# Patient Record
Sex: MALE | Race: WHITE | NOT HISPANIC OR LATINO | Employment: FULL TIME | URBAN - METROPOLITAN AREA
[De-identification: names, ages, dates, MRNs, and addresses within clinical notes are randomized per-mention and may not be internally consistent; named-entity substitution may affect disease eponyms.]

---

## 2019-02-27 ENCOUNTER — OFFICE VISIT (OUTPATIENT)
Dept: FAMILY MEDICINE | Facility: CLINIC | Age: 39
End: 2019-02-27
Payer: COMMERCIAL

## 2019-02-27 VITALS
HEART RATE: 77 BPM | TEMPERATURE: 98.6 F | DIASTOLIC BLOOD PRESSURE: 90 MMHG | WEIGHT: 205 LBS | HEIGHT: 72 IN | SYSTOLIC BLOOD PRESSURE: 148 MMHG | BODY MASS INDEX: 27.77 KG/M2 | RESPIRATION RATE: 17 BRPM | OXYGEN SATURATION: 96 %

## 2019-02-27 DIAGNOSIS — R03.0 ELEVATED BP WITHOUT DIAGNOSIS OF HYPERTENSION: Primary | ICD-10-CM

## 2019-02-27 PROCEDURE — 99203 OFFICE O/P NEW LOW 30 MIN: CPT | Performed by: PHYSICIAN ASSISTANT

## 2019-02-27 ASSESSMENT — MIFFLIN-ST. JEOR: SCORE: 1882.87

## 2019-02-27 NOTE — PATIENT INSTRUCTIONS
Patient Education     Eating Heart-Healthy Food: Using the DASH Plan    Eating for your heart doesn t have to be hard or boring. You just need to know how to make healthier choices. The DASH eating plan has been developed to help you do just that. DASH stands for Dietary Approaches to Stop Hypertension. It is a plan that has been proven to be healthier for your heart and to lower your risk for high blood pressure. It can also help lower your risk for cancer, heart disease, osteoporosis, and diabetes.  Choosing from each food group  Choose foods from each of the food groups below each day. Try to get the recommended number of servings for each food group. The serving numbers are based on a diet of 2,000 calories a day. Talk to your doctor if you re unsure about your calorie needs. Along with getting the correct servings, the DASH plan also recommends a sodium intake less than 2,300 mg per day.        Grains  Servings: 6 to 8 a day  A serving is:    1 slice bread    1 ounce dry cereal    Half a cup cooked rice, pasta or cereal  Best choices: Whole grains and any grains high in fiber. Vegetables  Servings: 4 to 5 a day  A serving is:    1 cup raw leafy vegetable    Half a cup cut-up raw or cooked vegetable    Half a cup vegetable juice  Best choices: Fresh or frozen vegetables prepared without added salt or fat.   Fruits  Servings: 4 to 5 a day  A serving is:    1 medium fruit    One-quarter cup dried fruit    Half a cup fresh, frozen, or canned fruit    Half a cup of 100% fruit juices  Best choices: A variety of fresh fruits of different colors. Whole fruits are a better choice than fruit juices. Low-fat or fat-free dairy  Servings: 2 to 3 a day  A serving is:    1 cup milk    1 cup yogurt    One and a half ounces cheese  Best choices: Skim or 1% milk, low-fat or fat-free yogurt or buttermilk, and low-fat cheeses.         Lean meats, poultry, fish  Servings: 6 or fewer a day  A serving is:    1 ounce cooked meats,  poultry, or fish    1 egg  Best choices: Lean poultry and fish. Trim away visible fat. Broil, grill, roast, or boil instead of frying. Remove skin from poultry before eating. Limit how much red meat you eat.  Nuts, seeds, beans  Servings: 4 to 5 a week  A serving is:    One-third cup nuts (one and a half ounces)    2 tablespoons nut butter or seeds    Half a cup cooked dry beans or legumes  Best choices: Dry roasted nuts with no salt added, lentils, kidney beans, garbanzo beans, and whole pablo beans.   Fats and oils  Servings: 2 to 3 a day  A serving is:    1 teaspoon vegetable oil    1 teaspoon soft margarine    1 tablespoon mayonnaise    2 tablespoons salad dressing  Best choices: Nut and vegetable oils (nontropical vegetable oils), such as olive and canola oil. Sweets  Servings: 5 a week or fewer  A serving is:    1 tablespoon sugar, maple syrup, or honey    1 tablespoon jam or jelly    1 half-ounce jelly beans (about 15)    1 cup lemonade  Best choices: Dried fruit can be a satisfying sweet. Choose low-fat sweets. And watch your serving sizes!      For more on the DASH eating plan, visit:  www.nhlbi.nih.gov/health/health-topics/topics/dash   Date Last Reviewed: 6/1/2016 2000-2018 [employer]. [address] [place] [address], [place] [address]. All rights reserved. This information is not intended as a substitute for professional medical care. Always follow your healthcare professional's instructions.

## 2019-02-27 NOTE — PROGRESS NOTES
SUBJECTIVE:   Jose Canales is a 39 year old male who presents to clinic today for the following health issues:      New Patient/Transfer of Care- patient is here to talk about his BP. He had a DOT PX and they stated he has High blood pressure was told he needed to be seen for it.  His BP on 2/25/19 was 150s/90s. No chest pain or shortness of breath. Family history of htn in father. No headaches or vision changes. No history of htn. No medications or supplements taken.     Problem list and histories reviewed & adjusted, as indicated.  Additional history: as documented    There is no problem list on file for this patient.    Past Surgical History:   Procedure Laterality Date     APPENDECTOMY  2005       Social History     Tobacco Use     Smoking status: Current Some Day Smoker     Smokeless tobacco: Never Used   Substance Use Topics     Alcohol use: Yes     Comment: socially     Family History   Problem Relation Age of Onset     Hypertension Father      Coronary Artery Disease Paternal Grandfather          No current outpatient medications on file.     No Known Allergies  BP Readings from Last 3 Encounters:   02/27/19 148/90   04/21/15 (!) 141/103   04/21/15 120/90    Wt Readings from Last 3 Encounters:   02/27/19 93 kg (205 lb)   04/21/15 88.9 kg (196 lb)   04/21/15 88.9 kg (196 lb)                    Reviewed and updated as needed this visit by clinical staff  Tobacco  Allergies  Meds  Problems  Med Hx  Surg Hx  Fam Hx  Soc Hx        Reviewed and updated as needed this visit by Provider  Tobacco  Allergies  Meds  Problems  Med Hx  Surg Hx  Fam Hx         ROS:  Constitutional, HEENT, cardiovascular, pulmonary, gi and gu systems are negative, except as otherwise noted.    OBJECTIVE:     /90 (BP Location: Right arm, Patient Position: Chair, Cuff Size: Adult Regular)   Pulse 77   Temp 98.6  F (37  C) (Oral)   Resp 17   Ht 1.829 m (6')   Wt 93 kg (205 lb)   SpO2 96%   BMI 27.80 kg/m     Body mass index is 27.8 kg/m .  GENERAL: healthy, alert and no distress  RESP: lungs clear to auscultation - no rales, rhonchi or wheezes  CV: regular rates and rhythm, normal S1 S2, no S3 or S4 and no murmur, click or rub  PSYCH: mentation appears normal, affect normal/bright    Diagnostic Test Results:  none     ASSESSMENT/PLAN:     (R03.0) Elevated BP without diagnosis of hypertension  (primary encounter diagnosis)  Comment: noted on exam. Needs 3 normal BPs prior to may of 2019 to receive long term dot from. Recommending returning to clinic in 1-2 weeks for BP check. If elevated, discussed starting lisinopril today. Patient is in agreement. Diet and life style changed discussed. See patient instructions. If lisinopril is needed, will need baseline bmp and follow up bmp 2 weeks post starting.   Plan:     Follow up: as above     John Kelley PA-C  [facility]

## 2019-03-11 ENCOUNTER — ALLIED HEALTH/NURSE VISIT (OUTPATIENT)
Dept: NURSING | Facility: CLINIC | Age: 39
End: 2019-03-11
Payer: COMMERCIAL

## 2019-03-11 VITALS — HEART RATE: 79 BPM | DIASTOLIC BLOOD PRESSURE: 80 MMHG | SYSTOLIC BLOOD PRESSURE: 130 MMHG

## 2019-03-11 DIAGNOSIS — Z01.30 BP CHECK: Primary | ICD-10-CM

## 2019-03-11 PROCEDURE — 99207 ZZC NO CHARGE NURSE ONLY: CPT

## 2019-03-11 NOTE — PROGRESS NOTES
Jose Canales is a 39 year old patient who comes in today for a Blood Pressure check.  Initial BP:  There were no vitals taken for this visit.     Data Unavailable  Disposition: follow-up as previously indicated by provider    Shari Schoenberger, CMA

## 2019-03-12 ENCOUNTER — ALLIED HEALTH/NURSE VISIT (OUTPATIENT)
Dept: NURSING | Facility: CLINIC | Age: 39
End: 2019-03-12
Payer: COMMERCIAL

## 2019-03-12 VITALS — SYSTOLIC BLOOD PRESSURE: 136 MMHG | DIASTOLIC BLOOD PRESSURE: 88 MMHG | HEART RATE: 80 BPM

## 2019-03-12 DIAGNOSIS — Z01.30 BP CHECK: Primary | ICD-10-CM

## 2019-03-12 PROCEDURE — 99207 ZZC NO CHARGE NURSE ONLY: CPT

## 2019-03-12 NOTE — PROGRESS NOTES
Jose Canales is a 39 year old patient who comes in today for a Blood Pressure check.  Initial BP:  /88 (BP Location: Right arm, Patient Position: Chair, Cuff Size: Adult Regular)   Pulse 80      80  Disposition: follow-up as previously indicated by provider    Shari Schoenberger, RANJIT

## 2019-03-30 ENCOUNTER — OFFICE VISIT (OUTPATIENT)
Dept: FAMILY MEDICINE | Facility: CLINIC | Age: 39
End: 2019-03-30
Payer: COMMERCIAL

## 2019-03-30 VITALS
SYSTOLIC BLOOD PRESSURE: 135 MMHG | WEIGHT: 195.8 LBS | BODY MASS INDEX: 26.52 KG/M2 | HEART RATE: 99 BPM | RESPIRATION RATE: 14 BRPM | OXYGEN SATURATION: 98 % | HEIGHT: 72 IN | TEMPERATURE: 98.2 F | DIASTOLIC BLOOD PRESSURE: 83 MMHG

## 2019-03-30 DIAGNOSIS — R03.0 ELEVATED BP WITHOUT DIAGNOSIS OF HYPERTENSION: ICD-10-CM

## 2019-03-30 DIAGNOSIS — Z00.00 VISIT FOR PREVENTIVE HEALTH EXAMINATION: Primary | ICD-10-CM

## 2019-03-30 DIAGNOSIS — J30.1 SEASONAL ALLERGIC RHINITIS DUE TO POLLEN: ICD-10-CM

## 2019-03-30 LAB
CHOLEST SERPL-MCNC: 175 MG/DL
GLUCOSE SERPL-MCNC: 85 MG/DL (ref 70–99)
HDLC SERPL-MCNC: 29 MG/DL
LDLC SERPL CALC-MCNC: 118 MG/DL
NONHDLC SERPL-MCNC: 146 MG/DL
TRIGL SERPL-MCNC: 140 MG/DL

## 2019-03-30 PROCEDURE — 99395 PREV VISIT EST AGE 18-39: CPT | Performed by: PHYSICIAN ASSISTANT

## 2019-03-30 PROCEDURE — 36415 COLL VENOUS BLD VENIPUNCTURE: CPT | Performed by: PHYSICIAN ASSISTANT

## 2019-03-30 PROCEDURE — 82947 ASSAY GLUCOSE BLOOD QUANT: CPT | Performed by: PHYSICIAN ASSISTANT

## 2019-03-30 PROCEDURE — 80061 LIPID PANEL: CPT | Performed by: PHYSICIAN ASSISTANT

## 2019-03-30 RX ORDER — MONTELUKAST SODIUM 10 MG/1
10 TABLET ORAL AT BEDTIME
Qty: 90 TABLET | Refills: 1 | Status: SHIPPED | OUTPATIENT
Start: 2019-03-30

## 2019-03-30 ASSESSMENT — ENCOUNTER SYMPTOMS
CONSTIPATION: 0
SORE THROAT: 0
FREQUENCY: 0
FEVER: 0
NAUSEA: 0
MYALGIAS: 0
EYE PAIN: 0
WEAKNESS: 0
PARESTHESIAS: 0
PALPITATIONS: 0
COUGH: 0
DIARRHEA: 0
HEMATOCHEZIA: 0
DIZZINESS: 0
CHILLS: 0
JOINT SWELLING: 0
SHORTNESS OF BREATH: 0
ARTHRALGIAS: 0
HEMATURIA: 0
HEADACHES: 0
NERVOUS/ANXIOUS: 0
ABDOMINAL PAIN: 0
HEARTBURN: 0
DYSURIA: 0

## 2019-03-30 ASSESSMENT — MIFFLIN-ST. JEOR: SCORE: 1841.14

## 2019-03-30 NOTE — PROGRESS NOTES
SUBJECTIVE:   CC: Jose Canales is an 39 year old male who presents for preventative health visit.     Physical   Annual:     Getting at least 3 servings of Calcium per day:  Yes    Bi-annual eye exam:  Yes    Dental care twice a year:  Yes    Sleep apnea or symptoms of sleep apnea:  None    Diet:  Carbohydrate counting    Frequency of exercise:  2-3 days/week    Duration of exercise:  Less than 15 minutes    Taking medications regularly:  Yes    Medication side effects:  Not applicable    Additional concerns today:  No    PHQ-2 Total Score: 0      History of seasonal allergies. Uses otc flonase, zaditor, and claritin. Minimal relief.     Has worked on diet and exercise changes since last visit. Has lost 10 lbs and blood pressure has been in the acceptable range.     Today's PHQ-2 Score:   PHQ-2 ( 1999 Pfizer) 3/30/2019   Q1: Little interest or pleasure in doing things 0   Q2: Feeling down, depressed or hopeless 0   PHQ-2 Score 0   Q1: Little interest or pleasure in doing things Not at all   Q2: Feeling down, depressed or hopeless Not at all   PHQ-2 Score 0       Abuse: Current or Past(Physical, Sexual or Emotional)- No  Do you feel safe in your environment? Yes    Social History     Tobacco Use     Smoking status: Never Smoker     Smokeless tobacco: Never Used   Substance Use Topics     Alcohol use: Yes     Comment: socially     Alcohol Use 3/30/2019   If you drink alcohol do you typically have greater than 3 drinks per day OR greater than 7 drinks per week? No   No flowsheet data found.    Last PSA: No results found for: PSA    Reviewed orders with patient. Reviewed health maintenance and updated orders accordingly - Yes  BP Readings from Last 3 Encounters:   03/30/19 135/83   03/12/19 136/88   03/11/19 130/80    Wt Readings from Last 3 Encounters:   03/30/19 88.8 kg (195 lb 12.8 oz)   02/27/19 93 kg (205 lb)   04/21/15 88.9 kg (196 lb)                  There is no problem list on file for this patient.     Past Surgical History:   Procedure Laterality Date     APPENDECTOMY  2005       Social History     Tobacco Use     Smoking status: Never Smoker     Smokeless tobacco: Never Used   Substance Use Topics     Alcohol use: Yes     Comment: socially     Family History   Problem Relation Age of Onset     Hypertension Father      Coronary Artery Disease Paternal Grandfather          Current Outpatient Medications   Medication Sig Dispense Refill     montelukast (SINGULAIR) 10 MG tablet Take 1 tablet (10 mg) by mouth At Bedtime 90 tablet 1     No Known Allergies    Reviewed and updated as needed this visit by clinical staff  Tobacco  Allergies  Meds  Problems  Med Hx  Surg Hx  Fam Hx  Soc Hx          Reviewed and updated as needed this visit by Provider  Tobacco  Allergies  Meds  Problems  Med Hx  Surg Hx  Fam Hx        History reviewed. No pertinent past medical history.   Past Surgical History:   Procedure Laterality Date     APPENDECTOMY  2005       Review of Systems   Constitutional: Negative for chills and fever.   HENT: Negative for congestion, ear pain, hearing loss and sore throat.    Eyes: Negative for pain and visual disturbance.   Respiratory: Negative for cough and shortness of breath.    Cardiovascular: Negative for chest pain, palpitations and peripheral edema.   Gastrointestinal: Negative for abdominal pain, constipation, diarrhea, heartburn, hematochezia and nausea.   Genitourinary: Negative for discharge, dysuria, frequency, genital sores, hematuria, impotence and urgency.   Musculoskeletal: Negative for arthralgias, joint swelling and myalgias.   Skin: Negative for rash.   Neurological: Negative for dizziness, weakness, headaches and paresthesias.   Psychiatric/Behavioral: Negative for mood changes. The patient is not nervous/anxious.        OBJECTIVE:   /83 (BP Location: Right arm, Patient Position: Chair, Cuff Size: Adult Large)   Pulse 99   Temp 98.2  F (36.8  C) (Oral)   Resp 14    Ht 1.829 m (6')   Wt 88.8 kg (195 lb 12.8 oz)   SpO2 98%   BMI 26.56 kg/m      Physical Exam  GENERAL: healthy, alert and no distress  EYES: Eyes grossly normal to inspection, PERRL and conjunctivae and sclerae normal  HENT: ear canals and TM's normal, nose and mouth without ulcers or lesions  NECK: no adenopathy, no asymmetry, masses, or scars and thyroid normal to palpation  RESP: lungs clear to auscultation - no rales, rhonchi or wheezes  CV: regular rate and rhythm, normal S1 S2, no S3 or S4, no murmur, click or rub, no peripheral edema and peripheral pulses strong  ABDOMEN: soft, nontender, no hepatosplenomegaly, no masses and bowel sounds normal   (male): normal male genitalia without lesions or urethral discharge, no hernia  MS: no gross musculoskeletal defects noted, no edema  SKIN: no suspicious lesions or rashes  NEURO: Normal strength and tone, mentation intact and speech normal  PSYCH: mentation appears normal, affect normal/bright  LYMPH: no cervical adenopathy    Diagnostic Test Results:  none     ASSESSMENT/PLAN:   (Z00.00) Visit for preventive health examination  (primary encounter diagnosis)  Comment: normal exam   Plan: Lipid panel reflex to direct LDL Fasting,         Glucose            (J30.1) Seasonal allergic rhinitis due to pollen  Comment: will add singulair and also recommending allergy evaluation given severity of symptoms.   Plan: montelukast (SINGULAIR) 10 MG tablet,         ALLERGY/ASTHMA ADULT REFERRAL        -Medication use and side effects discussed with the patient. Patient is in complete understanding and agreement with plan.       (R03.0) Elevated BP without diagnosis of hypertension  Comment: 3 acceptable BPs since last visit. Form filled out for DOT provider and given to patient.   Plan:     COUNSELING:   Reviewed preventive health counseling, as reflected in patient instructions       Regular exercise       Healthy diet/nutrition       self testicular exams    BP Readings  from Last 1 Encounters:   03/30/19 135/83     Estimated body mass index is 26.56 kg/m  as calculated from the following:    Height as of this encounter: 1.829 m (6').    Weight as of this encounter: 88.8 kg (195 lb 12.8 oz).    BP Screening:   Last 3 BP Readings:    BP Readings from Last 3 Encounters:   03/30/19 135/83   03/12/19 136/88   03/11/19 130/80       The following was recommended to the patient:  Re-screen BP within a year and recommended lifestyle modifications  Weight management plan: Discussed healthy diet and exercise guidelines     reports that  has never smoked. he has never used smokeless tobacco.      Counseling Resources:  ATP IV Guidelines  Pooled Cohorts Equation Calculator  FRAX Risk Assessment  ICSI Preventive Guidelines  Dietary Guidelines for Americans, 2010  USDA's MyPlate  ASA Prophylaxis  Lung CA Screening    John Kelley PA-C  [facility]

## 2019-09-28 ENCOUNTER — HEALTH MAINTENANCE LETTER (OUTPATIENT)
Age: 39
End: 2019-09-28

## 2021-01-10 ENCOUNTER — HEALTH MAINTENANCE LETTER (OUTPATIENT)
Age: 41
End: 2021-01-10

## 2021-10-23 ENCOUNTER — HEALTH MAINTENANCE LETTER (OUTPATIENT)
Age: 41
End: 2021-10-23

## 2022-02-12 ENCOUNTER — HEALTH MAINTENANCE LETTER (OUTPATIENT)
Age: 42
End: 2022-02-12

## 2022-10-09 ENCOUNTER — HEALTH MAINTENANCE LETTER (OUTPATIENT)
Age: 42
End: 2022-10-09

## 2023-02-18 ENCOUNTER — HEALTH MAINTENANCE LETTER (OUTPATIENT)
Age: 43
End: 2023-02-18

## 2023-03-22 ENCOUNTER — OFFICE VISIT (OUTPATIENT)
Dept: FAMILY MEDICINE | Facility: CLINIC | Age: 43
End: 2023-03-22
Payer: COMMERCIAL

## 2023-03-22 VITALS
RESPIRATION RATE: 17 BRPM | SYSTOLIC BLOOD PRESSURE: 146 MMHG | WEIGHT: 207.6 LBS | BODY MASS INDEX: 28.12 KG/M2 | HEART RATE: 100 BPM | HEIGHT: 72 IN | DIASTOLIC BLOOD PRESSURE: 94 MMHG | OXYGEN SATURATION: 100 % | TEMPERATURE: 97 F

## 2023-03-22 DIAGNOSIS — Z00.00 ROUTINE GENERAL MEDICAL EXAMINATION AT A HEALTH CARE FACILITY: Primary | ICD-10-CM

## 2023-03-22 DIAGNOSIS — I10 HYPERTENSION GOAL BP (BLOOD PRESSURE) < 140/90: ICD-10-CM

## 2023-03-22 PROCEDURE — 99213 OFFICE O/P EST LOW 20 MIN: CPT | Mod: 25 | Performed by: FAMILY MEDICINE

## 2023-03-22 PROCEDURE — 99386 PREV VISIT NEW AGE 40-64: CPT | Performed by: FAMILY MEDICINE

## 2023-03-22 RX ORDER — LOSARTAN POTASSIUM 50 MG/1
50 TABLET ORAL DAILY
Qty: 30 TABLET | Refills: 0 | Status: SHIPPED | OUTPATIENT
Start: 2023-03-22 | End: 2023-04-21

## 2023-03-22 ASSESSMENT — ENCOUNTER SYMPTOMS
HEARTBURN: 0
DIARRHEA: 0
ARTHRALGIAS: 0
NAUSEA: 0
NERVOUS/ANXIOUS: 0
ABDOMINAL PAIN: 0
CONSTIPATION: 0
DYSURIA: 0
HEMATURIA: 0
PARESTHESIAS: 0
CHILLS: 0
COUGH: 0
FEVER: 0
JOINT SWELLING: 0
SHORTNESS OF BREATH: 0
HEADACHES: 0
EYE PAIN: 0
DIZZINESS: 0
WEAKNESS: 0
FREQUENCY: 0
HEMATOCHEZIA: 0
MYALGIAS: 0
SORE THROAT: 0
PALPITATIONS: 0

## 2023-03-22 ASSESSMENT — PAIN SCALES - GENERAL: PAINLEVEL: NO PAIN (0)

## 2023-03-22 NOTE — PATIENT INSTRUCTIONS
1. Follow a healthy diet - reliable information is available at: myplate.gov.    2. Get regular exercise based on your abilities like walking, jogging, biking, swimming and strength training (150 minutes per week).      3. Avoid the sun or otherwise use sun screen to prevent skin cancer.    4. See the dentist and eye doctor regularly.     5. Ask your fiancee if you stop breathing at night and you'll tell me what she said at our follow up visit.    6. Start Losartan 50 mg daily. You might want to take 1/2 per day for 4 days, then go to a full pill per day.    7. See you on 4/21/23 - bring me daily blood pressure and heart rate readings:    Ideally it should be less than 130/80 but the cut off is 140/90.     Blood pressure checking tips:                 1. Sit in chair for a few minutes.              2. Back rested              3. Both feet on floor              4. Arm should be heart level.

## 2023-03-22 NOTE — PROGRESS NOTES
SUBJECTIVE:   CC: Jose is an 43 year old who presents for preventative health visit.   No flowsheet data found.Patient has been advised of split billing requirements and indicates understanding: Yes  Healthy Habits:     Getting at least 3 servings of Calcium per day:  NO    Bi-annual eye exam:  Yes    Dental care twice a year:  Yes    Sleep apnea or symptoms of sleep apnea:  None    Diet:  Regular (no restrictions)    Frequency of exercise:  1 day/week    Duration of exercise:  Less than 15 minutes    Taking medications regularly:  Yes    Medication side effects:  None    PHQ-2 Total Score: 0    Additional concerns today:  No    HTN (new dx today 3/22/23) - dad had HTN. Checks at home about once per month - 140/90 or a bit higher.  Evaluation and treatment:   He snores - I asked him to check with his fiancee if he stops breathing at night.    Discussed reducing salt.   Start Losartan 50 mg daily.   Advised to monitor at home - parameters given.    BP Readings from Last 6 Encounters:   23 (!) 146/94   19 135/83   19 136/88   19 130/80   19 148/90   04/21/15 (!) 141/103     Last Comprehensive Metabolic Panel:    Lab Results   Component Value Date    GLC 85 2019       Right testicle cyst - asymptomatic. Found on routine exam.  Evaluation and treatment:    Reassurance.    FH:    Dad  age 65 due to cholangiocarcinoma       Preventive - declines flu shot, declines covid shot, declines Tdap.    Immunization History   Administered Date(s) Administered     COVID-19 Vaccine 12+ (Pfizer) 2021, 2021     OPV, trivalent, live 1997     Td (Adult), Adsorbed 10/16/1997     -HIV/Hep C screen: declines     - Colon CA screen: Colonoscopy at age 45.    -Prostate cancer screen: PSA at age 50.    -lipids screen:     Recent Labs   Lab Test 19  0835   CHOL 175   HDL 29*   *   TRIG 140     Today's PHQ-2 Score:   PHQ-2 (  Pfizer) 3/22/2023   Q1: Little interest or  pleasure in doing things 0   Q2: Feeling down, depressed or hopeless 0   PHQ-2 Score 0   PHQ-2 Total Score (12-17 Years)- Positive if 3 or more points; Administer PHQ-A if positive -   Q1: Little interest or pleasure in doing things Not at all   Q2: Feeling down, depressed or hopeless Not at all   PHQ-2 Score 0       Have you ever done Advance Care Planning? (For example, a Health Directive, POLST, or a discussion with a medical provider or your loved ones about your wishes): No, advance care planning information given to patient to review.  Patient declined advance care planning discussion at this time.    Social History     Tobacco Use     Smoking status: Never     Smokeless tobacco: Never   Substance Use Topics     Alcohol use: Yes     Comment: socially         Alcohol Use 3/22/2023   Prescreen: >3 drinks/day or >7 drinks/week? No       Last PSA: No results found for: PSA    Reviewed orders with patient. Reviewed health maintenance and updated orders accordingly - Yes      Reviewed and updated as needed this visit by clinical staff   Tobacco  Allergies  Meds              Reviewed and updated as needed this visit by Provider                     Review of Systems   Constitutional: Negative for chills and fever.   HENT: Negative for congestion, ear pain, hearing loss and sore throat.    Eyes: Negative for pain and visual disturbance.   Respiratory: Negative for cough and shortness of breath.    Cardiovascular: Negative for chest pain, palpitations and peripheral edema.   Gastrointestinal: Negative for abdominal pain, constipation, diarrhea, heartburn, hematochezia and nausea.   Genitourinary: Negative for dysuria, frequency, genital sores, hematuria, impotence, penile discharge and urgency.   Musculoskeletal: Negative for arthralgias, joint swelling and myalgias.   Skin: Negative for rash.   Neurological: Negative for dizziness, weakness, headaches and paresthesias.   Psychiatric/Behavioral: Negative for mood  changes. The patient is not nervous/anxious.          OBJECTIVE:   BP (!) 154/108   Pulse 100   Temp 97  F (36.1  C) (Tympanic)   Resp 17   Ht 1.829 m (6')   Wt 94.2 kg (207 lb 9.6 oz)   SpO2 100%   BMI 28.16 kg/m      Physical Exam  GENERAL: healthy, alert and no distress  EYES: Eyes grossly normal to inspection, PERRL and conjunctivae and sclerae normal  HENT: ear canals and TM's normal, nose and mouth without ulcers or lesions  NECK: no adenopathy, no asymmetry, masses, or scars and thyroid normal to palpation  RESP: lungs clear to auscultation - no rales, rhonchi or wheezes  CV: regular rate and rhythm, normal S1 S2, no S3 or S4, no murmur, click or rub, no peripheral edema and peripheral pulses strong  ABDOMEN: soft, nontender, no hepatosplenomegaly, no masses and bowel sounds normal   (male): normal male genitalia without lesions or urethral discharge, no hernia. The testicles are normal. There is a cystic lump about 1.5 cm, freely mobile, in the right scrotum, separate from the testicle.  MS: no gross musculoskeletal defects noted, no edema  SKIN: no suspicious lesions or rashes  NEURO: Normal strength and tone, mentation intact and speech normal  PSYCH: mentation appears normal, affect normal/bright    ASSESSMENT/PLAN:       COUNSELING:   Reviewed preventive health counseling, as reflected in patient instructions       Regular exercise       Healthy diet/nutrition        He reports that he has never smoked. He has never used smokeless tobacco.      Assessment and Plan - Decision Making    1. Routine general medical examination at a health care facility    Results of today's exam given to patient verbally along with age appropriate preventive measures. Written instructions reviewed and handed to the patient.      2. Hypertension goal BP (blood pressure) < 140/90    Per HPI    - losartan (COZAAR) 50 MG tablet; Take 1 tablet (50 mg) by mouth daily  Dispense: 30 tablet; Refill: 0      Written  instructions given as follows:    Patient Instructions   1. Follow a healthy diet - reliable information is available at: myplate.gov.    2. Get regular exercise based on your abilities like walking, jogging, biking, swimming and strength training (150 minutes per week).      3. Avoid the sun or otherwise use sun screen to prevent skin cancer.    4. See the dentist and eye doctor regularly.     5. Ask your fiancee if you stop breathing at night and you'll tell me what she said at our follow up visit.    6. Start Losartan 50 mg daily. You might want to take 1/2 per day for 4 days, then go to a full pill per day.    7. See you on 4/21/23 - bring me daily blood pressure and heart rate readings:    Ideally it should be less than 130/80 but the cut off is 140/90.     Blood pressure checking tips:                 1. Sit in chair for a few minutes.              2. Back rested              3. Both feet on floor              4. Arm should be heart level.

## 2023-04-21 ENCOUNTER — OFFICE VISIT (OUTPATIENT)
Dept: FAMILY MEDICINE | Facility: CLINIC | Age: 43
End: 2023-04-21
Payer: COMMERCIAL

## 2023-04-21 VITALS
HEART RATE: 85 BPM | TEMPERATURE: 97.2 F | WEIGHT: 208 LBS | HEIGHT: 72 IN | SYSTOLIC BLOOD PRESSURE: 112 MMHG | OXYGEN SATURATION: 98 % | BODY MASS INDEX: 28.17 KG/M2 | RESPIRATION RATE: 20 BRPM | DIASTOLIC BLOOD PRESSURE: 80 MMHG

## 2023-04-21 DIAGNOSIS — Z00.00 PREVENTATIVE HEALTH CARE: ICD-10-CM

## 2023-04-21 DIAGNOSIS — I10 HYPERTENSION GOAL BP (BLOOD PRESSURE) < 140/90: Primary | ICD-10-CM

## 2023-04-21 DIAGNOSIS — G47.33 OSA (OBSTRUCTIVE SLEEP APNEA): ICD-10-CM

## 2023-04-21 LAB
ALBUMIN SERPL-MCNC: 4.1 G/DL (ref 3.4–5)
ALP SERPL-CCNC: 63 U/L (ref 40–150)
ALT SERPL W P-5'-P-CCNC: 53 U/L (ref 0–70)
ANION GAP SERPL CALCULATED.3IONS-SCNC: 4 MMOL/L (ref 3–14)
AST SERPL W P-5'-P-CCNC: 22 U/L (ref 0–45)
BASOPHILS # BLD AUTO: 0 10E3/UL (ref 0–0.2)
BASOPHILS NFR BLD AUTO: 0 %
BILIRUB SERPL-MCNC: 0.5 MG/DL (ref 0.2–1.3)
BUN SERPL-MCNC: 10 MG/DL (ref 7–30)
CALCIUM SERPL-MCNC: 9.1 MG/DL (ref 8.5–10.1)
CHLORIDE BLD-SCNC: 106 MMOL/L (ref 94–109)
CHOLEST SERPL-MCNC: 192 MG/DL
CO2 SERPL-SCNC: 27 MMOL/L (ref 20–32)
CREAT SERPL-MCNC: 0.92 MG/DL (ref 0.66–1.25)
EOSINOPHIL # BLD AUTO: 0.2 10E3/UL (ref 0–0.7)
EOSINOPHIL NFR BLD AUTO: 2 %
ERYTHROCYTE [DISTWIDTH] IN BLOOD BY AUTOMATED COUNT: 12.4 % (ref 10–15)
FASTING STATUS PATIENT QL REPORTED: YES
GFR SERPL CREATININE-BSD FRML MDRD: >90 ML/MIN/1.73M2
GLUCOSE BLD-MCNC: 96 MG/DL (ref 70–99)
HCT VFR BLD AUTO: 45.1 % (ref 40–53)
HDLC SERPL-MCNC: 38 MG/DL
HGB BLD-MCNC: 15.2 G/DL (ref 13.3–17.7)
LDLC SERPL CALC-MCNC: 130 MG/DL
LYMPHOCYTES # BLD AUTO: 2 10E3/UL (ref 0.8–5.3)
LYMPHOCYTES NFR BLD AUTO: 26 %
MCH RBC QN AUTO: 30 PG (ref 26.5–33)
MCHC RBC AUTO-ENTMCNC: 33.7 G/DL (ref 31.5–36.5)
MCV RBC AUTO: 89 FL (ref 78–100)
MONOCYTES # BLD AUTO: 0.6 10E3/UL (ref 0–1.3)
MONOCYTES NFR BLD AUTO: 9 %
NEUTROPHILS # BLD AUTO: 4.6 10E3/UL (ref 1.6–8.3)
NEUTROPHILS NFR BLD AUTO: 63 %
NONHDLC SERPL-MCNC: 154 MG/DL
PLATELET # BLD AUTO: 355 10E3/UL (ref 150–450)
POTASSIUM BLD-SCNC: 4.1 MMOL/L (ref 3.4–5.3)
PROT SERPL-MCNC: 7.7 G/DL (ref 6.8–8.8)
RBC # BLD AUTO: 5.07 10E6/UL (ref 4.4–5.9)
SODIUM SERPL-SCNC: 137 MMOL/L (ref 133–144)
TRIGL SERPL-MCNC: 120 MG/DL
WBC # BLD AUTO: 7.4 10E3/UL (ref 4–11)

## 2023-04-21 PROCEDURE — 80061 LIPID PANEL: CPT | Performed by: FAMILY MEDICINE

## 2023-04-21 PROCEDURE — 87389 HIV-1 AG W/HIV-1&-2 AB AG IA: CPT | Performed by: FAMILY MEDICINE

## 2023-04-21 PROCEDURE — 99396 PREV VISIT EST AGE 40-64: CPT | Performed by: FAMILY MEDICINE

## 2023-04-21 PROCEDURE — 99213 OFFICE O/P EST LOW 20 MIN: CPT | Mod: 25 | Performed by: FAMILY MEDICINE

## 2023-04-21 PROCEDURE — 86803 HEPATITIS C AB TEST: CPT | Performed by: FAMILY MEDICINE

## 2023-04-21 PROCEDURE — 80053 COMPREHEN METABOLIC PANEL: CPT | Performed by: FAMILY MEDICINE

## 2023-04-21 PROCEDURE — 36415 COLL VENOUS BLD VENIPUNCTURE: CPT | Performed by: FAMILY MEDICINE

## 2023-04-21 PROCEDURE — 85025 COMPLETE CBC W/AUTO DIFF WBC: CPT | Performed by: FAMILY MEDICINE

## 2023-04-21 RX ORDER — LOSARTAN POTASSIUM 50 MG/1
50 TABLET ORAL DAILY
Qty: 90 TABLET | Refills: 3 | Status: SHIPPED | OUTPATIENT
Start: 2023-04-21 | End: 2024-04-30

## 2023-04-21 ASSESSMENT — PAIN SCALES - GENERAL: PAINLEVEL: NO PAIN (0)

## 2023-04-21 NOTE — PROGRESS NOTES
Jose is a 43 year old male here for follow up:    Please note: only the issues listed at the bottom under Assessment and Plan are addressed today. The rest of the medical problems are listed for the sake of completeness.    HTN (new dx today 3/22/23) - dad had HTN. Checks at home about once per month - 140/90 or a bit higher.  Evaluation and treatment:   Probable LEIF - see below.     Discussed reducing salt.   Losartan 50 mg daily - some lightheadedness when getting up but has improved.   No change in treatment at this time.   Advised to monitor at home - parameters given.   Discussed low salt diet.    BP Readings from Last 6 Encounters:   04/21/23 112/80   03/22/23 (!) 146/94   03/30/19 135/83   03/12/19 136/88   03/11/19 130/80   02/27/19 148/90     Lab Results   Component Value Date     04/21/2023    POTASSIUM 4.1 04/21/2023    CHLORIDE 106 04/21/2023    CO2 27 04/21/2023    ANIONGAP 4 04/21/2023    GLC 96 04/21/2023    BUN 10 04/21/2023    CR 0.92 04/21/2023    GFRESTIMATED >90 04/21/2023    HOSEA 9.1 04/21/2023       Right testicle cyst - asymptomatic. Found on routine exam.  Evaluation and treatment:    Reassurance.    Overweight, probable LEIF -    He snores - I asked him to check with his fiancee if he stops breathing at night - she said yes so I am referring him to sleep specialist.   Diet and exercise discussed.    Body mass index is 28.21 kg/m .    Wt Readings from Last 5 Encounters:   04/21/23 94.3 kg (208 lb)   03/22/23 94.2 kg (207 lb 9.6 oz)   03/30/19 88.8 kg (195 lb 12.8 oz)   02/27/19 93 kg (205 lb)   04/21/15 88.9 kg (196 lb)     Dyslipidemia - No history of CAD, CVA, PAD or diabetes.   Evaluation and treatment:    Per ATP4, no statin recommended.    The 10-year ASCVD risk score (Natty OMALLEY, et al., 2019) is: 2%    Values used to calculate the score:      Age: 43 years      Sex: Male      Is Non- : No      Diabetic: No      Tobacco smoker: No      Systolic Blood  Pressure: 112 mmHg      Is BP treated: Yes      HDL Cholesterol: 38 mg/dL      Total Cholesterol: 192 mg/dL      FH:    Dad  age 65 due to cholangiocarcinoma       Preventive - declines flu shot, declines covid shot, declines Tdap.    Immunization History   Administered Date(s) Administered     COVID-19 Vaccine 12+ (Pfizer) 2021, 2021     OPV, trivalent, live 1997     Td (Adult), Adsorbed 10/16/1997     -HIV/Hep C screen: ordered today    - Colon CA screen: Colonoscopy at age 45.    -Prostate cancer screen: PSA at age 50.    Exam:    /80   Pulse 85   Temp 97.2  F (36.2  C) (Tympanic)   Resp 20   Ht 1.829 m (6')   Wt 94.3 kg (208 lb)   SpO2 98%   BMI 28.21 kg/m      Gen: Healthy appearing male in no acute distress    Assessment and Plan - Decision Making    1. Hypertension goal BP (blood pressure) < 140/90    Per HPI    - losartan (COZAAR) 50 MG tablet; Take 1 tablet (50 mg) by mouth daily  Dispense: 90 tablet; Refill: 3    2. Preventative health care    Per HPI    - Comprehensive metabolic panel (BMP + Alb, Alk Phos, ALT, AST, Total. Bili, TP)  - CBC with platelets and differential  - Lipid panel reflex to direct LDL Fasting  - HIV Antigen Antibody Combo  - Hepatitis C Screen Reflex to HCV RNA Quant and Genotype    3. LEIF (obstructive sleep apnea)    Per HPI    - Adult Sleep Eval & Management  Referral; Future      Written instructions given as follows:    Patient Instructions   Set up an appointment with the sleep specialist - see below for phone number.    If all is well, come back in one year for a physical.

## 2023-04-21 NOTE — PATIENT INSTRUCTIONS
Set up an appointment with the sleep specialist - see below for phone number.    If all is well, come back in one year for a physical.

## 2023-04-23 NOTE — RESULT ENCOUNTER NOTE
Diann Garcia,    Your cholesterol was a bit high. Your estimated 10 year risk of a heart attack or stroke is 2%. Statin drugs are recommended at 7.5% or greater.     Therefore you don't need medications. But please double up your efforts in diet and exercise.    All other labs were fine    RegardsRonak M.D.    The 10-year ASCVD risk score (Natty OMALLEY, et al., 2019) is: 2%    Values used to calculate the score:      Age: 43 years      Sex: Male      Is Non- : No      Diabetic: No      Tobacco smoker: No      Systolic Blood Pressure: 112 mmHg      Is BP treated: Yes      HDL Cholesterol: 38 mg/dL      Total Cholesterol: 192 mg/dL

## 2023-04-24 LAB
HCV AB SERPL QL IA: NONREACTIVE
HIV 1+2 AB+HIV1 P24 AG SERPL QL IA: NONREACTIVE

## 2023-12-20 ENCOUNTER — TELEPHONE (OUTPATIENT)
Dept: FAMILY MEDICINE | Facility: CLINIC | Age: 43
End: 2023-12-20
Payer: COMMERCIAL

## 2023-12-20 NOTE — TELEPHONE ENCOUNTER
Patient Quality Outreach    Patient is due for the following:       Topic Date Due    Hepatitis B Vaccine (1 of 3 - 3-dose series) Never done    Diptheria Tetanus Pertussis (DTAP/TDAP/TD) Vaccine (2 - Tdap) 10/17/1997    Polio Vaccine (2 of 3 - 4-dose series) 12/10/1997    Flu Vaccine (1) Never done    COVID-19 Vaccine (3 - 2023-24 season) 09/01/2023       Next Steps:   Schedule a nurse only visit for immunizations.    Type of outreach:    Sent [employer] message.      Questions for provider review:    None           MELCHOR MALCOLM MA

## 2024-04-30 DIAGNOSIS — I10 HYPERTENSION GOAL BP (BLOOD PRESSURE) < 140/90: ICD-10-CM

## 2024-04-30 NOTE — LETTER
May 1, 2024    Jose Canales  [address]  [facility] [address]    Dear Jose,       We recently received a refill request for losartan (COZAAR) 50 MG tablet.  We have refilled this for a one time 30 day supply only because you are due for a:    [facility] office visit      Please call at your earliest convenience so that there will not be a delay with your future refills.          Thank you,   Your [facility] Team/AL  [phone]

## 2024-05-01 RX ORDER — LOSARTAN POTASSIUM 50 MG/1
50 TABLET ORAL DAILY
Qty: 30 TABLET | Refills: 0 | Status: SHIPPED | OUTPATIENT
Start: 2024-05-01 | End: 2024-06-20

## 2024-05-25 ENCOUNTER — HEALTH MAINTENANCE LETTER (OUTPATIENT)
Age: 44
End: 2024-05-25

## 2024-06-13 NOTE — PROGRESS NOTES
{PROVIDER CHARTING PREFERENCE:889394}    Thom Garcia is a 44 year old, presenting for the following health issues:  Hypertension (/)        6/20/2024     7:58 AM   Additional Questions   Roomed by Daniel Sanchez MA   Accompanied by Self     - Discuss Soreness in the sternum area per pt on and off.    History of Present Illness       Hypertension: He presents for follow up of hypertension.  He does check blood pressure  regularly outside of the clinic. Outside blood pressures have been over 140/90. He follows a low salt diet.     He eats 2-3 servings of fruits and vegetables daily.He consumes 1 sweetened beverage(s) daily.He exercises with enough effort to increase his heart rate 9 or less minutes per day.  He exercises with enough effort to increase his heart rate 3 or less days per week. He is missing 1 dose(s) of medications per week.         Review of Systems  Constitutional, neuro, ENT, endocrine, pulmonary, cardiac, gastrointestinal, genitourinary, musculoskeletal, integument and psychiatric systems are negative, except as otherwise noted.        Objective    /84   Pulse 77   Temp 97.6  F (36.4  C) (Tympanic)   Resp 14   Ht 1.829 m (6')   Wt 95.7 kg (211 lb)   SpO2 98%   BMI 28.62 kg/m    Body mass index is 28.62 kg/m .      Physical Exam   {Exam List (Optional):834812}          Signed Electronically by: Farhat Wu PA-C  {Email feedback regarding this note to [email or web address]   :[id number]}

## 2024-06-20 ENCOUNTER — OFFICE VISIT (OUTPATIENT)
Dept: FAMILY MEDICINE | Facility: CLINIC | Age: 44
End: 2024-06-20
Payer: COMMERCIAL

## 2024-06-20 ENCOUNTER — ANCILLARY PROCEDURE (OUTPATIENT)
Dept: GENERAL RADIOLOGY | Facility: CLINIC | Age: 44
End: 2024-06-20
Attending: PHYSICIAN ASSISTANT
Payer: COMMERCIAL

## 2024-06-20 VITALS
SYSTOLIC BLOOD PRESSURE: 133 MMHG | HEIGHT: 72 IN | TEMPERATURE: 97.6 F | DIASTOLIC BLOOD PRESSURE: 84 MMHG | BODY MASS INDEX: 28.58 KG/M2 | HEART RATE: 77 BPM | WEIGHT: 211 LBS | RESPIRATION RATE: 14 BRPM | OXYGEN SATURATION: 98 %

## 2024-06-20 DIAGNOSIS — R07.89 STERNUM PAIN: ICD-10-CM

## 2024-06-20 DIAGNOSIS — R10.13 DYSPEPSIA: ICD-10-CM

## 2024-06-20 DIAGNOSIS — Z00.00 ROUTINE HISTORY AND PHYSICAL EXAMINATION OF ADULT: Primary | ICD-10-CM

## 2024-06-20 DIAGNOSIS — Z13.6 CARDIOVASCULAR SCREENING; LDL GOAL LESS THAN 130: ICD-10-CM

## 2024-06-20 DIAGNOSIS — I10 HYPERTENSION GOAL BP (BLOOD PRESSURE) < 140/90: ICD-10-CM

## 2024-06-20 LAB
ANION GAP SERPL CALCULATED.3IONS-SCNC: 12 MMOL/L (ref 7–15)
BUN SERPL-MCNC: 11.8 MG/DL (ref 6–20)
CALCIUM SERPL-MCNC: 9.7 MG/DL (ref 8.6–10)
CHLORIDE SERPL-SCNC: 104 MMOL/L (ref 98–107)
CHOLEST SERPL-MCNC: 215 MG/DL
CREAT SERPL-MCNC: 1 MG/DL (ref 0.67–1.17)
DEPRECATED HCO3 PLAS-SCNC: 23 MMOL/L (ref 22–29)
EGFRCR SERPLBLD CKD-EPI 2021: >90 ML/MIN/1.73M2
FASTING STATUS PATIENT QL REPORTED: YES
FASTING STATUS PATIENT QL REPORTED: YES
GLUCOSE SERPL-MCNC: 105 MG/DL (ref 70–99)
HDLC SERPL-MCNC: 36 MG/DL
LDLC SERPL CALC-MCNC: 148 MG/DL
NONHDLC SERPL-MCNC: 179 MG/DL
POTASSIUM SERPL-SCNC: 4.5 MMOL/L (ref 3.4–5.3)
SODIUM SERPL-SCNC: 139 MMOL/L (ref 135–145)
TRIGL SERPL-MCNC: 156 MG/DL

## 2024-06-20 PROCEDURE — 99214 OFFICE O/P EST MOD 30 MIN: CPT | Mod: 25 | Performed by: PHYSICIAN ASSISTANT

## 2024-06-20 PROCEDURE — 99396 PREV VISIT EST AGE 40-64: CPT | Performed by: PHYSICIAN ASSISTANT

## 2024-06-20 PROCEDURE — 80061 LIPID PANEL: CPT | Performed by: PHYSICIAN ASSISTANT

## 2024-06-20 PROCEDURE — 36415 COLL VENOUS BLD VENIPUNCTURE: CPT | Performed by: PHYSICIAN ASSISTANT

## 2024-06-20 PROCEDURE — 80048 BASIC METABOLIC PNL TOTAL CA: CPT | Performed by: PHYSICIAN ASSISTANT

## 2024-06-20 PROCEDURE — 71120 X-RAY EXAM BREASTBONE 2/>VWS: CPT | Mod: TC | Performed by: RADIOLOGY

## 2024-06-20 RX ORDER — LOSARTAN POTASSIUM 50 MG/1
50 TABLET ORAL DAILY
Qty: 30 TABLET | Refills: 0 | Status: SHIPPED | OUTPATIENT
Start: 2024-06-20 | End: 2024-06-20

## 2024-06-20 RX ORDER — LOSARTAN POTASSIUM 50 MG/1
50 TABLET ORAL DAILY
Qty: 90 TABLET | Refills: 3 | Status: SHIPPED | OUTPATIENT
Start: 2024-06-20

## 2024-06-20 ASSESSMENT — PAIN SCALES - GENERAL: PAINLEVEL: NO PAIN (0)

## 2024-06-20 NOTE — PROGRESS NOTES
Preventive Care Visit  [facility]  Farhat Wu PA-C, Physician Assistant - Medical  Jun 20, 2024      Assessment & Plan     Routine history and physical examination of adult  Completed, past due.  Discussed lifestyle mods to make    Hypertension goal BP (blood pressure) < 140/90  Stable, well controlled   - losartan (COZAAR) 50 MG tablet; Take 1 tablet (50 mg) by mouth daily  - Basic metabolic panel  (Ca, Cl, CO2, Creat, Gluc, K, Na, BUN); Future  - Basic metabolic panel  (Ca, Cl, CO2, Creat, Gluc, K, Na, BUN)  Refilled x 1 year without change    Sternum pain  For few months   - XR Sternum 2 Views; Future  Discussed etiologies, likely to be mild trauma with reproducible pain. Sometimes wife hits this area overnight while sleeping.  Pt wanted xray which I have ordered     CARDIOVASCULAR SCREENING; LDL GOAL LESS THAN 130  ordered  - Lipid panel reflex to direct LDL Fasting; Future  - Lipid panel reflex to direct LDL Fasting    Dyspepsia  Some mild symptoms.   - omeprazole (PRILOSEC) 20 MG DR capsule; Take 1 capsule (20 mg) by mouth daily  Discussed lifestyle mods. Goal to take daily 14 days then as needed after that         BMI  Estimated body mass index is 28.62 kg/m  as calculated from the following:    Height as of this encounter: 1.829 m (6').    Weight as of this encounter: 95.7 kg (211 lb).   Weight management plan: Discussed healthy diet and exercise guidelines      Follow up yearly     Thom Garcia is a 44 year old, presenting for the following:  Hypertension (/)        6/20/2024     7:58 AM   Additional Questions   Roomed by Daniel Sanchez MA   Accompanied by Self        Health Care Directive  Patient does not have a Health Care Directive or Living Will:     HPI                3/22/2023   Nutrition   Three or more servings of calcium each day? No   Diet: regular (no restrictions)            3/22/2023   Exercise   Frequency of exercise: 1 day/week               3/22/2023   Dental    Dentist two times every year? Yes               Today's PHQ-2 Score:       6/20/2024     8:00 AM   PHQ-2 ( 1999 Pfizer)   Q1: Little interest or pleasure in doing things 0   Q2: Feeling down, depressed or hopeless 0   PHQ-2 Score 0   Q1: Little interest or pleasure in doing things Not at all   Q2: Feeling down, depressed or hopeless Not at all   PHQ-2 Score 0           3/22/2023   Substance Use   Alcohol more than 3/day or more than 7/wk No        Social History     Tobacco Use    Smoking status: Never    Smokeless tobacco: Never   Vaping Use    Vaping status: Never Used   Substance Use Topics    Alcohol use: Yes     Comment: socially    Drug use: No       ASCVD Risk   The 10-year ASCVD risk score (Natty OMALLEY, et al., 2019) is: 3%    Values used to calculate the score:      Age: 44 years      Sex: Male      Is Non- : No      Diabetic: No      Tobacco smoker: No      Systolic Blood Pressure: 133 mmHg      Is BP treated: Yes      HDL Cholesterol: 38 mg/dL      Total Cholesterol: 192 mg/dL             Reviewed and updated as needed this visit by Provider   Tobacco  Allergies  Meds  Problems  Med Hx  Surg Hx  Fam Hx            History reviewed. No pertinent past medical history.  Past Surgical History:   Procedure Laterality Date    APPENDECTOMY  2005     Lab work is in process  Labs reviewed in EPIC      Review of Systems  Constitutional, HEENT, cardiovascular, pulmonary, gi and gu systems are negative, except as otherwise noted.     Objective    Exam  /84   Pulse 77   Temp 97.6  F (36.4  C) (Tympanic)   Resp 14   Ht 1.829 m (6')   Wt 95.7 kg (211 lb)   SpO2 98%   BMI 28.62 kg/m     Estimated body mass index is 28.62 kg/m  as calculated from the following:    Height as of this encounter: 1.829 m (6').    Weight as of this encounter: 95.7 kg (211 lb).    Physical Exam  GENERAL: alert and no distress  EYES: Eyes grossly normal to inspection, PERRL and conjunctivae and  sclerae normal  HENT: ear canals and TM's normal, nose and mouth without ulcers or lesions  NECK: no adenopathy, no asymmetry, masses, or scars  RESP: lungs clear to auscultation - no rales, rhonchi or wheezes  CV: regular rate and rhythm, normal S1 S2, no S3 or S4, no murmur, click or rub, no peripheral edema  ABDOMEN: soft, nontender, no hepatosplenomegaly, no masses and bowel sounds normal  MS: no gross musculoskeletal defects noted, no edema. Mild tenderness lower sternum with direct palpation, worse with sitting   PSYCH: mentation appears normal, affect normal/bright        Signed Electronically by: Farhat Wu PA-C

## 2025-07-16 DIAGNOSIS — I10 HYPERTENSION GOAL BP (BLOOD PRESSURE) < 140/90: ICD-10-CM

## 2025-07-16 RX ORDER — LOSARTAN POTASSIUM 50 MG/1
50 TABLET ORAL DAILY
Qty: 30 TABLET | Refills: 0 | Status: SHIPPED | OUTPATIENT
Start: 2025-07-16

## 2025-07-26 ENCOUNTER — HEALTH MAINTENANCE LETTER (OUTPATIENT)
Age: 45
End: 2025-07-26

## 2025-08-12 DIAGNOSIS — I10 HYPERTENSION GOAL BP (BLOOD PRESSURE) < 140/90: ICD-10-CM

## 2025-08-13 RX ORDER — LOSARTAN POTASSIUM 50 MG/1
50 TABLET ORAL DAILY
Qty: 30 TABLET | Refills: 0 | Status: SHIPPED | OUTPATIENT
Start: 2025-08-13